# Patient Record
Sex: FEMALE | Race: WHITE | NOT HISPANIC OR LATINO | URBAN - METROPOLITAN AREA
[De-identification: names, ages, dates, MRNs, and addresses within clinical notes are randomized per-mention and may not be internally consistent; named-entity substitution may affect disease eponyms.]

---

## 2018-02-21 ENCOUNTER — NEW PATIENT (OUTPATIENT)
Dept: URBAN - METROPOLITAN AREA CLINIC 24 | Facility: CLINIC | Age: 58
End: 2018-02-21
Payer: COMMERCIAL

## 2018-02-21 DIAGNOSIS — Z96.1 PRESENCE OF INTRAOCULAR LENS: ICD-10-CM

## 2018-02-21 DIAGNOSIS — H52.4 PRESBYOPIA: Primary | ICD-10-CM

## 2018-02-21 DIAGNOSIS — H53.022 REFRACTIVE AMBLYOPIA, LEFT EYE: ICD-10-CM

## 2018-02-21 PROCEDURE — 92015 DETERMINE REFRACTIVE STATE: CPT | Performed by: OPTOMETRIST

## 2018-02-21 PROCEDURE — 92004 COMPRE OPH EXAM NEW PT 1/>: CPT | Performed by: OPTOMETRIST

## 2018-02-21 ASSESSMENT — KERATOMETRY
OD: 47.03
OS: 47.24

## 2018-02-21 ASSESSMENT — VISUAL ACUITY
OD: 20/25
OS: 20/60

## 2018-02-21 ASSESSMENT — INTRAOCULAR PRESSURE
OS: 10
OD: 12

## 2019-01-14 ENCOUNTER — FOLLOW UP ESTABLISHED (OUTPATIENT)
Dept: URBAN - METROPOLITAN AREA CLINIC 24 | Facility: CLINIC | Age: 59
End: 2019-01-14
Payer: COMMERCIAL

## 2019-01-14 DIAGNOSIS — H26.493 OTHER SECONDARY CATARACT, BILATERAL: ICD-10-CM

## 2019-01-14 PROCEDURE — 92015 DETERMINE REFRACTIVE STATE: CPT | Performed by: OPTOMETRIST

## 2019-01-14 PROCEDURE — 92014 COMPRE OPH EXAM EST PT 1/>: CPT | Performed by: OPTOMETRIST

## 2019-01-14 ASSESSMENT — KERATOMETRY
OS: 47.03
OD: 46.80

## 2019-01-14 ASSESSMENT — INTRAOCULAR PRESSURE
OS: 12
OD: 12

## 2019-01-14 ASSESSMENT — VISUAL ACUITY
OS: 20/40
OD: 20/25

## 2020-05-28 ENCOUNTER — TELEPHONE (OUTPATIENT)
Dept: OBGYN | Facility: CLINIC | Age: 60
End: 2020-05-28

## 2020-05-28 NOTE — TELEPHONE ENCOUNTER
Patient asked if we could send her medical records from her surgery back in 2016 to her . She asked to speak with Cristina Guidry specifically. Please return call to patient, okay to leave message.

## 2020-05-28 NOTE — TELEPHONE ENCOUNTER
Patient going to have  fax over release of information for removal of essure devices.  Send reports.  ROLA Padilla

## 2020-09-03 NOTE — TELEPHONE ENCOUNTER
Patient has follow up questions regarding the paperwork from her surgery back in 2016. Would like a call back from Cristina Guidry as soon as possible.

## 2020-09-09 NOTE — TELEPHONE ENCOUNTER
Talked with patient and faxed the operative note for patient to her .  There is a release of records on file for .  ROLA Padilla

## 2020-09-17 ENCOUNTER — TELEPHONE (OUTPATIENT)
Dept: OBGYN | Facility: CLINIC | Age: 60
End: 2020-09-17

## 2020-09-17 NOTE — TELEPHONE ENCOUNTER
Chart reviewed by Pascale Solo NP does not appear ovaries were removed. Spoke with Darío Ingram ([phone]) [facility]  who had called to review the report. Per Darío pt is very upset and insists she was told her ovaries were removed. Informed Cristina is out of the office until Monday but will send her [facility] chart so she can call her when she is back in clinic. Could possibly do an ultrasound to visualize the ovaries.   Darío is calling Kim back  China San RN on 9/17/2020 at 3:01 PM

## 2020-09-17 NOTE — TELEPHONE ENCOUNTER
Patient calls back and says her  is saying that the report doesn't say that her ovary was removed. Patient is getting really frustrated since patient knows it does say it and was confirmed by Cristina that the report indicates that her ovary was removed.     Patient said she gave her  the number to call the clinic to get a verbal about this as well.

## 2020-09-25 ENCOUNTER — TELEPHONE (OUTPATIENT)
Dept: OBGYN | Facility: CLINIC | Age: 60
End: 2020-09-25

## 2020-09-25 DIAGNOSIS — Z90.721 OVARY ABSENT: Primary | ICD-10-CM

## 2020-09-25 NOTE — TELEPHONE ENCOUNTER
See previous notes, patient would like imaging order to be faxed to [facility] in [place].  She will call back with fax number.

## 2020-10-08 ENCOUNTER — TELEPHONE (OUTPATIENT)
Dept: OBGYN | Facility: CLINIC | Age: 60
End: 2020-10-08

## 2020-10-08 DIAGNOSIS — Z90.721 OVARY ABSENT: Primary | ICD-10-CM

## 2020-10-08 NOTE — TELEPHONE ENCOUNTER
Pt will not do a transvaginal US to view ovaries. Ok for pelvic ultrasound to be ordered and faxed? Pt needs to know tomorrow. Fax number in previous TE  China San RN on 10/8/2020 at 4:24 PM

## 2020-10-09 NOTE — TELEPHONE ENCOUNTER
"Per her op note from 10/2016 with Dr Raines, she had a partial right salpingectomy and partial oophorectomy prior to her seeing Dr. SCHMIDT.     In 2016, Dr. SCHMIDT removed only her tubes. He left both the partial right ovary and entire left ovary.       Does she still need an US with that information?    \"On the right side, there had been a partial salpingectomy and apparently a partial oophorectomy.  There was residual ovary on the right side and normal ovary on the left side.\"   "

## 2020-10-09 NOTE — TELEPHONE ENCOUNTER
Fax number is [phone]  [facility] in [place]    New order placed and faxed to above location as requested.  Left detailed vm message informing pt.    Pascale Rubalcava RN on 10/9/2020 at 11:03 AM

## 2020-10-10 ENCOUNTER — TRANSFERRED RECORDS (OUTPATIENT)
Dept: HEALTH INFORMATION MANAGEMENT | Facility: CLINIC | Age: 60
End: 2020-10-10

## 2020-10-24 ENCOUNTER — TRANSFERRED RECORDS (OUTPATIENT)
Dept: HEALTH INFORMATION MANAGEMENT | Facility: CLINIC | Age: 60
End: 2020-10-24

## 2020-10-29 ENCOUNTER — TELEPHONE (OUTPATIENT)
Dept: OBGYN | Facility: CLINIC | Age: 60
End: 2020-10-29
Payer: MEDICARE

## 2020-10-29 DIAGNOSIS — Z90.721 OVARY ABSENT: Primary | ICD-10-CM

## 2020-10-29 NOTE — TELEPHONE ENCOUNTER
Pt called stating an urgent necessity due to a family emergency of needing the results from US done with [facility] in [place], [place] that Cristina had placed an order for. Pt asking if we have not yet received the report from that US to please contact them at [phone] to request they release it STAT (if possible) and if we can then in turn get that information needed released to the patient. Please advise.

## 2020-10-30 NOTE — TELEPHONE ENCOUNTER
Spoke with pt, informed of US report-this report has been sent to HIM to be scanned into chart-has decided to have a transvaginal US. Order faxed.   China San RN on 10/30/2020 at 2:23 PM

## 2020-10-30 NOTE — TELEPHONE ENCOUNTER
LMTCB-I have the ultrasound report on my desk, need to know where to send it to. Cristina Guirdy NP is aware of results.   China San RN on 10/30/2020 at 8:37 AM

## 2020-11-10 ENCOUNTER — TRANSFERRED RECORDS (OUTPATIENT)
Dept: HEALTH INFORMATION MANAGEMENT | Facility: CLINIC | Age: 60
End: 2020-11-10

## 2020-11-13 ENCOUNTER — TELEPHONE (OUTPATIENT)
Dept: OBGYN | Facility: CLINIC | Age: 60
End: 2020-11-13

## 2020-11-13 NOTE — TELEPHONE ENCOUNTER
Patient is awaiting transvaginal imaging results that were completed at [facility].    Will have [facility] fax them to [phone].      Report received and placed on [employer] desk.  Do you want us to do anything besides send her the report?      Lorri Phillip RN on 11/13/2020 at 2:43 PM

## 2020-11-17 NOTE — TELEPHONE ENCOUNTER
Called pt with negative pelvic US. Pt is adamant that she has her left ovary removed. Discussed ovaries could be obstructed by bowel loops. Pt will decide where to go from here.     She is frantic as her sister has a dx of ovarian ca.

## 2021-11-18 NOTE — TELEPHONE ENCOUNTER
Talked with patient regarding operative note and surgical pathology.  She has had a partial right ovary removed previously.  Patient states Dr. Raines had told her he removed the left ovary and left the partial right ovary, which is what she is trying to clarify.  With the passing of DR. Raines we cannot verify that with him.  Discussed with patient to do a pelvic US to verify this, she lives in [facility].  She is having a MRI in the next week, going to figure out where to have US and they may call for us to order for her.  Toby, RNC   [place] Pregnancy And Lactation Text: This medication is not safe during pregnancy. It is unknown if this medication is excreted in breast milk.

## 2024-12-20 ENCOUNTER — OFFICE VISIT (OUTPATIENT)
Dept: URBAN - METROPOLITAN AREA CLINIC 28 | Facility: CLINIC | Age: 64
End: 2024-12-20
Payer: COMMERCIAL

## 2024-12-20 DIAGNOSIS — H10.12 ACUTE ATOPIC CONJUNCTIVITIS, LEFT EYE: Primary | ICD-10-CM

## 2024-12-20 PROCEDURE — 99204 OFFICE O/P NEW MOD 45 MIN: CPT | Performed by: OPTOMETRIST

## 2024-12-20 RX ORDER — PREDNISOLONE ACETATE 10 MG/ML
1 % SUSPENSION/ DROPS OPHTHALMIC
Qty: 5 | Refills: 0 | Status: INACTIVE
Start: 2024-12-20 | End: 2025-01-03